# Patient Record
Sex: MALE | URBAN - METROPOLITAN AREA
[De-identification: names, ages, dates, MRNs, and addresses within clinical notes are randomized per-mention and may not be internally consistent; named-entity substitution may affect disease eponyms.]

---

## 2022-03-02 ENCOUNTER — APPOINTMENT (RX ONLY)
Dept: URBAN - METROPOLITAN AREA CLINIC 332 | Facility: CLINIC | Age: 77
Setting detail: DERMATOLOGY
End: 2022-03-02

## 2022-03-02 PROBLEM — C44.41 BASAL CELL CARCINOMA OF SKIN OF SCALP AND NECK: Status: ACTIVE | Noted: 2022-03-02

## 2022-03-02 PROBLEM — C44.722 SQUAMOUS CELL CARCINOMA OF SKIN OF RIGHT LOWER LIMB, INCLUDING HIP: Status: ACTIVE | Noted: 2022-03-02

## 2022-03-02 PROCEDURE — ? ADDITIONAL NOTES

## 2022-03-02 PROCEDURE — 99203 OFFICE O/P NEW LOW 30 MIN: CPT

## 2022-03-02 PROCEDURE — ? SURGICAL DECISION MAKING

## 2022-03-02 PROCEDURE — ? MOHS APPROPRIATE USE CRITERIA

## 2022-03-02 NOTE — PROCEDURE: MOHS APPROPRIATE USE CRITERIA
Initial Size Of Lesion In Cm (Y Axis): 1
Detail Level: Detailed
Initial Size Of Lesion In Cm (X Axis): 1.2
Initial Size Of Lesion In Cm (X Axis): 1.1
Initial Size Of Lesion In Cm (Y Axis): 0.8
Initial Size Of Lesion In Cm (Y Axis): 1.5
Initial Size Of Lesion In Cm (X Axis): 1.7

## 2022-03-02 NOTE — PROCEDURE: ADDITIONAL NOTES
Additional Notes: Patient consent was obtained to proceed with the visit and recommended plan of care after discussion of all risks and benefits, including the risks of COVID-19 exposure.
Render Risk Assessment In Note?: no
Detail Level: Simple
Additional Notes: Patient consent  was obtained to proceed with the visit and recommended plan of care after discussion of all risks and benefits, including the risks of COVID-19 exposure.

## 2022-03-02 NOTE — PROCEDURE: SURGICAL DECISION MAKING
Discussion: Risks may include and are not limited to blood loss, infection, reaction to anesthesia, numbness and/or lack of sensation, nerve damage, recurrence of the lesion or the formation of thick or otherwise objectionable scars or keloids, benefits to Mohs Surgery and alternatives to treatment to include (but not limited to) X-ray radiation treatment, ED&C (scrapte and burn procedure), cryosurgery, standard excision, interferon and laser treatments discussed at length with patient today.
Initial Decision For Surgery?: Yes
Complexity (Necessary For Coding; Major - 90 Day Global With Some Exceptions; Minor - 10 Day Global): minor
Risk Assessment Explanation (Does Not Render In The Note): Clinical determination of the probability and/or consequences of an event, such as surgery. Clinical assessment of the level of risk is affected by the nature of the event under consideration for the patient. Modifier 57 is used to indicate an Evaluation and Management (E/M) service resulted in the initial decision to perform surgery either the day before a major surgery (90 day global) or the day of a major surgery.
Initial Decision For Surgery?: No
Complexity (Necessary For Coding; Major - 90 Day Global With Some Exceptions; Minor - 10 Day Global): major

## 2022-06-17 NOTE — PERIOP NOTE
Tc from patient regarding preop instructions for scheduled surgery on 06/20/22-arrival info of 1045am given w/npo instructions, no valuables, baths x2 using medical soap, , and hospital address given-pt verb understandng of all info given/thogan,rn

## 2022-06-20 ENCOUNTER — HOSPITAL ENCOUNTER (OUTPATIENT)
Age: 77
Setting detail: OUTPATIENT SURGERY
Discharge: HOME | End: 2022-06-20
Attending: OTOLARYNGOLOGY | Admitting: OTOLARYNGOLOGY
Payer: MEDICARE

## 2022-06-20 ENCOUNTER — ANESTHESIA (OUTPATIENT)
Dept: SURGERY | Age: 77
End: 2022-06-20
Payer: MEDICARE

## 2022-06-20 ENCOUNTER — ANESTHESIA EVENT (OUTPATIENT)
Dept: SURGERY | Age: 77
End: 2022-06-20
Payer: MEDICARE

## 2022-06-20 VITALS
WEIGHT: 168.2 LBS | OXYGEN SATURATION: 98 % | SYSTOLIC BLOOD PRESSURE: 124 MMHG | HEART RATE: 76 BPM | RESPIRATION RATE: 20 BRPM | BODY MASS INDEX: 22.78 KG/M2 | DIASTOLIC BLOOD PRESSURE: 60 MMHG | HEIGHT: 72 IN | TEMPERATURE: 97.5 F

## 2022-06-20 LAB
HGB BLD-MCNC: 11 G/DL (ref 13.6–17.2)
POTASSIUM BLD-SCNC: 3.2 MMOL/L (ref 3.5–5.1)

## 2022-06-20 PROCEDURE — 3600000004 HC SURGERY LEVEL 4 BASE: Performed by: OTOLARYNGOLOGY

## 2022-06-20 PROCEDURE — 2709999900 HC NON-CHARGEABLE SUPPLY: Performed by: OTOLARYNGOLOGY

## 2022-06-20 PROCEDURE — 3700000000 HC ANESTHESIA ATTENDED CARE: Performed by: OTOLARYNGOLOGY

## 2022-06-20 PROCEDURE — 2580000003 HC RX 258: Performed by: NURSE ANESTHETIST, CERTIFIED REGISTERED

## 2022-06-20 PROCEDURE — 7100000001 HC PACU RECOVERY - ADDTL 15 MIN: Performed by: OTOLARYNGOLOGY

## 2022-06-20 PROCEDURE — 7100000000 HC PACU RECOVERY - FIRST 15 MIN: Performed by: OTOLARYNGOLOGY

## 2022-06-20 PROCEDURE — 2580000003 HC RX 258: Performed by: ANESTHESIOLOGY

## 2022-06-20 PROCEDURE — 7100000011 HC PHASE II RECOVERY - ADDTL 15 MIN: Performed by: OTOLARYNGOLOGY

## 2022-06-20 PROCEDURE — 6360000002 HC RX W HCPCS: Performed by: NURSE ANESTHETIST, CERTIFIED REGISTERED

## 2022-06-20 PROCEDURE — 2500000003 HC RX 250 WO HCPCS: Performed by: NURSE ANESTHETIST, CERTIFIED REGISTERED

## 2022-06-20 PROCEDURE — 3600000014 HC SURGERY LEVEL 4 ADDTL 15MIN: Performed by: OTOLARYNGOLOGY

## 2022-06-20 PROCEDURE — 84132 ASSAY OF SERUM POTASSIUM: CPT

## 2022-06-20 PROCEDURE — 7100000010 HC PHASE II RECOVERY - FIRST 15 MIN: Performed by: OTOLARYNGOLOGY

## 2022-06-20 PROCEDURE — 6360000002 HC RX W HCPCS: Performed by: ANESTHESIOLOGY

## 2022-06-20 PROCEDURE — 6370000000 HC RX 637 (ALT 250 FOR IP): Performed by: ANESTHESIOLOGY

## 2022-06-20 PROCEDURE — 3700000001 HC ADD 15 MINUTES (ANESTHESIA): Performed by: OTOLARYNGOLOGY

## 2022-06-20 PROCEDURE — 2500000003 HC RX 250 WO HCPCS: Performed by: OTOLARYNGOLOGY

## 2022-06-20 PROCEDURE — 85018 HEMOGLOBIN: CPT

## 2022-06-20 PROCEDURE — 6360000002 HC RX W HCPCS: Performed by: OTOLARYNGOLOGY

## 2022-06-20 PROCEDURE — 36415 COLL VENOUS BLD VENIPUNCTURE: CPT

## 2022-06-20 RX ORDER — FENTANYL CITRATE 50 UG/ML
100 INJECTION, SOLUTION INTRAMUSCULAR; INTRAVENOUS
Status: COMPLETED | OUTPATIENT
Start: 2022-06-20 | End: 2022-06-20

## 2022-06-20 RX ORDER — HALOPERIDOL 5 MG/ML
1 INJECTION INTRAMUSCULAR
Status: DISCONTINUED | OUTPATIENT
Start: 2022-06-20 | End: 2022-06-20 | Stop reason: HOSPADM

## 2022-06-20 RX ORDER — LIDOCAINE HYDROCHLORIDE AND EPINEPHRINE 10; 10 MG/ML; UG/ML
INJECTION, SOLUTION INFILTRATION; PERINEURAL PRN
Status: DISCONTINUED | OUTPATIENT
Start: 2022-06-20 | End: 2022-06-20 | Stop reason: ALTCHOICE

## 2022-06-20 RX ORDER — IPRATROPIUM BROMIDE AND ALBUTEROL SULFATE 2.5; .5 MG/3ML; MG/3ML
1 SOLUTION RESPIRATORY (INHALATION)
Status: DISCONTINUED | OUTPATIENT
Start: 2022-06-20 | End: 2022-06-20 | Stop reason: HOSPADM

## 2022-06-20 RX ORDER — AMOXICILLIN AND CLAVULANATE POTASSIUM 875; 125 MG/1; MG/1
1 TABLET, FILM COATED ORAL 2 TIMES DAILY
Qty: 14 TABLET | Refills: 0 | Status: SHIPPED | OUTPATIENT
Start: 2022-06-20 | End: 2022-06-27

## 2022-06-20 RX ORDER — HYDROMORPHONE HYDROCHLORIDE 2 MG/ML
0.5 INJECTION, SOLUTION INTRAMUSCULAR; INTRAVENOUS; SUBCUTANEOUS EVERY 5 MIN PRN
Status: DISCONTINUED | OUTPATIENT
Start: 2022-06-20 | End: 2022-06-20 | Stop reason: HOSPADM

## 2022-06-20 RX ORDER — ACETAMINOPHEN 500 MG
1000 TABLET ORAL ONCE
Status: COMPLETED | OUTPATIENT
Start: 2022-06-20 | End: 2022-06-20

## 2022-06-20 RX ORDER — PROCHLORPERAZINE EDISYLATE 5 MG/ML
5 INJECTION INTRAMUSCULAR; INTRAVENOUS
Status: DISCONTINUED | OUTPATIENT
Start: 2022-06-20 | End: 2022-06-20 | Stop reason: HOSPADM

## 2022-06-20 RX ORDER — OXYCODONE HYDROCHLORIDE 5 MG/1
5 TABLET ORAL
Status: DISCONTINUED | OUTPATIENT
Start: 2022-06-20 | End: 2022-06-20 | Stop reason: HOSPADM

## 2022-06-20 RX ORDER — ONDANSETRON 2 MG/ML
INJECTION INTRAMUSCULAR; INTRAVENOUS PRN
Status: DISCONTINUED | OUTPATIENT
Start: 2022-06-20 | End: 2022-06-20 | Stop reason: SDUPTHER

## 2022-06-20 RX ORDER — DEXAMETHASONE SODIUM PHOSPHATE 4 MG/ML
INJECTION, SOLUTION INTRA-ARTICULAR; INTRALESIONAL; INTRAMUSCULAR; INTRAVENOUS; SOFT TISSUE PRN
Status: DISCONTINUED | OUTPATIENT
Start: 2022-06-20 | End: 2022-06-20

## 2022-06-20 RX ORDER — MIDAZOLAM HYDROCHLORIDE 2 MG/2ML
2 INJECTION, SOLUTION INTRAMUSCULAR; INTRAVENOUS
Status: DISCONTINUED | OUTPATIENT
Start: 2022-06-20 | End: 2022-06-20 | Stop reason: HOSPADM

## 2022-06-20 RX ORDER — SODIUM CHLORIDE 0.9 % (FLUSH) 0.9 %
5-40 SYRINGE (ML) INJECTION EVERY 12 HOURS SCHEDULED
Status: DISCONTINUED | OUTPATIENT
Start: 2022-06-20 | End: 2022-06-20 | Stop reason: HOSPADM

## 2022-06-20 RX ORDER — SODIUM CHLORIDE 0.9 % (FLUSH) 0.9 %
5-40 SYRINGE (ML) INJECTION PRN
Status: DISCONTINUED | OUTPATIENT
Start: 2022-06-20 | End: 2022-06-20 | Stop reason: HOSPADM

## 2022-06-20 RX ORDER — LIDOCAINE HYDROCHLORIDE 20 MG/ML
INJECTION, SOLUTION EPIDURAL; INFILTRATION; INTRACAUDAL; PERINEURAL PRN
Status: DISCONTINUED | OUTPATIENT
Start: 2022-06-20 | End: 2022-06-20 | Stop reason: SDUPTHER

## 2022-06-20 RX ORDER — SCOLOPAMINE TRANSDERMAL SYSTEM 1 MG/1
1 PATCH, EXTENDED RELEASE TRANSDERMAL
Status: DISCONTINUED | OUTPATIENT
Start: 2022-06-20 | End: 2022-06-20 | Stop reason: HOSPADM

## 2022-06-20 RX ORDER — DEXAMETHASONE SODIUM PHOSPHATE 10 MG/ML
INJECTION INTRAMUSCULAR; INTRAVENOUS PRN
Status: DISCONTINUED | OUTPATIENT
Start: 2022-06-20 | End: 2022-06-20 | Stop reason: SDUPTHER

## 2022-06-20 RX ORDER — SODIUM CHLORIDE, SODIUM LACTATE, POTASSIUM CHLORIDE, CALCIUM CHLORIDE 600; 310; 30; 20 MG/100ML; MG/100ML; MG/100ML; MG/100ML
INJECTION, SOLUTION INTRAVENOUS CONTINUOUS
Status: DISCONTINUED | OUTPATIENT
Start: 2022-06-20 | End: 2022-06-20 | Stop reason: HOSPADM

## 2022-06-20 RX ORDER — SODIUM CHLORIDE 9 MG/ML
INJECTION, SOLUTION INTRAVENOUS PRN
Status: DISCONTINUED | OUTPATIENT
Start: 2022-06-20 | End: 2022-06-20 | Stop reason: HOSPADM

## 2022-06-20 RX ORDER — PROPOFOL 10 MG/ML
INJECTION, EMULSION INTRAVENOUS PRN
Status: DISCONTINUED | OUTPATIENT
Start: 2022-06-20 | End: 2022-06-20 | Stop reason: SDUPTHER

## 2022-06-20 RX ADMIN — PHENYLEPHRINE HYDROCHLORIDE 1 ML: 10 INJECTION INTRAVENOUS at 13:43

## 2022-06-20 RX ADMIN — ONDANSETRON 4 MG: 2 INJECTION INTRAMUSCULAR; INTRAVENOUS at 14:10

## 2022-06-20 RX ADMIN — PROPOFOL 50 MG: 10 INJECTION, EMULSION INTRAVENOUS at 13:32

## 2022-06-20 RX ADMIN — LIDOCAINE HYDROCHLORIDE 40 MG: 20 INJECTION, SOLUTION EPIDURAL; INFILTRATION; INTRACAUDAL; PERINEURAL at 13:30

## 2022-06-20 RX ADMIN — PROPOFOL 130 MG: 10 INJECTION, EMULSION INTRAVENOUS at 13:30

## 2022-06-20 RX ADMIN — SODIUM CHLORIDE, POTASSIUM CHLORIDE, SODIUM LACTATE AND CALCIUM CHLORIDE: 600; 310; 30; 20 INJECTION, SOLUTION INTRAVENOUS at 11:47

## 2022-06-20 RX ADMIN — PHENYLEPHRINE HYDROCHLORIDE 1 ML: 10 INJECTION INTRAVENOUS at 13:48

## 2022-06-20 RX ADMIN — ACETAMINOPHEN 1000 MG: 500 TABLET, FILM COATED ORAL at 11:44

## 2022-06-20 RX ADMIN — Medication 2 G: at 13:26

## 2022-06-20 RX ADMIN — FENTANYL CITRATE 50 MCG: 50 INJECTION, SOLUTION INTRAMUSCULAR; INTRAVENOUS at 13:30

## 2022-06-20 RX ADMIN — PHENYLEPHRINE HYDROCHLORIDE 1 ML: 10 INJECTION INTRAVENOUS at 13:45

## 2022-06-20 RX ADMIN — DEXAMETHASONE SODIUM PHOSPHATE 10 MG: 10 INJECTION INTRAMUSCULAR; INTRAVENOUS at 13:50

## 2022-06-20 RX ADMIN — PHENYLEPHRINE HYDROCHLORIDE 2 ML: 10 INJECTION INTRAVENOUS at 13:56

## 2022-06-20 ASSESSMENT — PAIN DESCRIPTION - LOCATION: LOCATION: HIP

## 2022-06-20 ASSESSMENT — PAIN DESCRIPTION - ORIENTATION: ORIENTATION: LEFT

## 2022-06-20 ASSESSMENT — PAIN DESCRIPTION - DESCRIPTORS
DESCRIPTORS: ACHING
DESCRIPTORS: ACHING

## 2022-06-20 NOTE — ANESTHESIA POSTPROCEDURE EVALUATION
Department of Anesthesiology  Postprocedure Note    Patient: Amy Tran  MRN: [id number]  YOB: 1945  Date of evaluation: 6/20/2022      Procedure Summary     Date: 06/20/22 Room / Location: [facility] MAIN OR  / [facility] MAIN OR    Anesthesia Start: 1316 Anesthesia Stop: 8921    Procedure: SPLIT THICKNESS GRAFT FROM RIGHT THIGH TO SCALP (Left Head) Diagnosis:       Acquired deformity of head      Basal cell carcinoma of scalp or skin of neck      Open wound of scalp, unspecified open wound type, initial encounter      (Acquired deformity of head [M95.2])      (Basal cell carcinoma of scalp or skin of neck [C44.41])      (Open wound of scalp, unspecified open wound type, initial encounter [S01.00XA])    Providers: Eduardo Link MD Responsible Provider: Orlene Hatchet, MD    Anesthesia Type: general ASA Status: 3          Anesthesia Type: No value filed. Stuart Phase I: Stuart Score: 9    Stuart Phase II: Stuart Score: 10    Last vitals:   Vitals Value Taken Time   /60 06/20/22 1500   Temp 97.5 °F (36.4 °C) 06/20/22 1455   Pulse 83 06/20/22 1501   Resp 20 06/20/22 1500   SpO2 96 % 06/20/22 1501   Vitals shown include unvalidated device data.       Anesthesia Post Evaluation    Patient location during evaluation: PACU  Patient participation: complete - patient participated  Level of consciousness: awake and awake and alert  Airway patency: patent  Nausea & Vomiting: no nausea  Complications: no  Cardiovascular status: hemodynamically stable  Hydration status: euvolemic

## 2022-06-20 NOTE — PERIOP NOTE
Discharge instructions reviewed with pt and family member Salazar Sanchez -wife) who verbalize understanding of follow up care.

## 2022-06-20 NOTE — ANESTHESIA PROCEDURE NOTES
Airway  Date/Time: 6/20/2022 1:33 PM  Urgency: elective    Airway not difficult    General Information and Staff    Patient location during procedure: OR  Resident/CRNA: DAVE Gilmore - CRNA    Indications and Patient Condition  Indications for airway management: anesthesia  Spontaneous ventilation: present  Sedation level: deep  Preoxygenated: yes  Patient position: sniffing  MILS not maintained throughout  Mask difficulty assessment: vent by bag mask    Final Airway Details  Final airway type: supraglottic airway      Successful airway: oropharyngeal  Size 5    Number of attempts at approach: 3 or more  Ventilation between attempts: bag mask  Number of other approaches attempted: 1    Other Attempts  Unsuccessful attempted airways: oropharyngeal      Additional Comments  Changed #4lma to #5

## 2022-06-20 NOTE — H&P
History and Physical [facility]      SUBJECTIVE:   Robbin Baca is a 64 y.o. male who presents for interval split thickness skin grafting after scalp resection of a large basal cell carcinoma. He has no infection or purulence of his scalp and has had Integra for the prior 4 weeks without difficulty. OBJECTIVE:  Vital signs as noted above. Patient appears to be in no pain. Physical Exam  Constitutional:       Appearance: He is normal weight. HENT:      Head: Normocephalic. Comments: 4cm x 8cm scalp defect with Integra - no bleeding or purulence. Some residual staples and sutures     Mouth/Throat:      Mouth: Mucous membranes are moist.      Pharynx: Oropharynx is clear. Cardiovascular:      Rate and Rhythm: Normal rate and regular rhythm. Pulses: Normal pulses. Heart sounds: Normal heart sounds. Pulmonary:      Effort: Pulmonary effort is normal. No respiratory distress. Breath sounds: Normal breath sounds. No stridor. Musculoskeletal:         General: No swelling, tenderness, deformity or signs of injury. Skin:     General: Skin is warm and dry. Comments: Right thigh appropriate for skin grafting   Neurological:      General: No focal deficit present. Mental Status: He is alert. ASSESSMENT:   64 yr old male with a large predominantly right sided scalp defect who presents for definitive skin grafting and bolster placement. Donor site is right thigh. PLAN:  OR under Anesthesia choice for STSG from right thigh to scalp  Bolster placement  Patient to come to clinic at [facility] on Thursday 2/23 for bolster take down. Consent reviewed and obtained.

## 2022-06-20 NOTE — ANESTHESIA PRE PROCEDURE
Department of Anesthesiology  Preprocedure Note       Name:  Kelby Pritchard   Age:  64 y.o.  :  1945                                          MRN:  [id number]         Date:  2022      Surgeon: Nury Alegria): Frandy Cullen MD    Procedure: Procedure(s):  SPLIT THICKNESS GRAFT FROM LEFT THIGH TO SCALP    Medications prior to admission:   Prior to Admission medications    Medication Sig Start Date End Date Taking?  Authorizing Provider   metoprolol succinate (TOPROL XL) 50 MG extended release tablet Take 50 mg by mouth daily    Historical Provider, MD   furosemide (LASIX) 40 MG tablet Take 40 mg by mouth daily 22  Historical Provider, MD   atorvastatin (LIPITOR) 40 MG tablet Take 40 mg by mouth daily 2/15/22 2/15/23  Historical Provider, MD   tamsulosin (FLOMAX) 0.4 mg capsule Take 0.4 mg by mouth daily    Historical Provider, MD   docusate sodium (COLACE) 100 mg capsule Take 100 mg by mouth 2 times daily    Historical Provider, MD       Current medications:    Current Facility-Administered Medications   Medication Dose Route Frequency Provider Last Rate Last Admin    lidocaine 1 % injection 1 mL  1 mL IntraDERmal Once PRN Stephanie Armenta MD        acetaminophen (TYLENOL) tablet 1,000 mg  1,000 mg Oral Once Stephanie Armenta MD        fentaNYL (SUBLIMAZE) injection 100 mcg  100 mcg IntraVENous Once PRN Stephanie Armenta MD        lactated ringers infusion   IntraVENous Continuous Stephanie Armenta MD        sodium chloride flush 0.9 % injection 5-40 mL  5-40 mL IntraVENous 2 times per day Stephanie Armenta MD        sodium chloride flush 0.9 % injection 5-40 mL  5-40 mL IntraVENous PRN Stephanie Armenta MD        0.9 % sodium chloride infusion   IntraVENous PRN Stephanie Armenta MD        midazolam PF (VERSED) injection 2 mg  2 mg IntraVENous Once PRN Stephanie Armenta MD        scopolamine (TRANSDERM-SCOP) transdermal patch 1 patch  1 patch TransDERmal NOW Fabiana Boggs MD           Allergies:  No Known Allergies    Problem List:  There is no problem list on file for this patient. Past Medical History:        Diagnosis Date    Arthritis     Basal cell carcinoma     CAD (coronary artery disease)     Colon cancer ([facility] [place] [address]     Hyperlipidemia     Hypertension     Nonfunctioning kidney     Left    Renal hemorrhage, left 04/2022       Past Surgical History:        Procedure Laterality Date    CATARACT REMOVAL WITH IMPLANT      COLON SURGERY      resection    CORONARY ARTERY BYPASS GRAFT      MOHS SURGERY      RENAL ARTERY EMBOLIZATION  04/2022    SCALP LESION REMOVAL W/ FLAP AND SKIN GRAFT  05/20/2022    SKIN CANCER EXCISION         Social History:    Social History     Tobacco Use    Smoking status: Current Some Day Smoker     Types: Cigars    Smokeless tobacco: Never Used   Substance Use Topics    Alcohol use: Not Currently                                Ready to quit: Not Answered  Counseling given: Not Answered      Vital Signs (Current): There were no vitals filed for this visit. BP Readings from Last 3 Encounters:   No data found for BP       NPO Status:                                                                                 BMI:   Wt Readings from Last 3 Encounters:   06/13/22 170 lb (77.1 kg)     There is no height or weight on file to calculate BMI.    CBC: No results found for: WBC, RBC, HGB, HCT, MCV, RDW, PLT    CMP: No results found for: NA, K, CL, CO2, BUN, CREATININE, GFRAA, AGRATIO, LABGLOM, GLUCOSE, GLU, PROT, CALCIUM, BILITOT, ALKPHOS, AST, ALT    POC Tests:   Recent Labs     06/20/22  1123   POCK 3.2*       Coags: No results found for: PROTIME, INR, APTT    HCG (If Applicable): No results found for: PREGTESTUR, PREGSERUM, HCG, HCGQUANT     ABGs: No results found for: PHART, PO2ART, ISF4YEF, CZR3KVJ, BEART, V4EUNLDW     Type & Screen (If Applicable):  No results found for: LABABO, LABRH    Drug/Infectious Status (If Applicable):   No results found for: HIV, HEPCAB    COVID-19 Screening (If Applicable): No results found for: COVID19        Anesthesia Evaluation  Patient summary reviewed and Nursing notes reviewed  Airway: Mallampati: II  TM distance: >3 FB   Neck ROM: full     Dental:          Pulmonary:Negative Pulmonary ROS and normal exam                               Cardiovascular:  Exercise tolerance: good (>4 METS),   (+) hypertension:, CAD: no interval change, CABG/stent: no interval change,         Rhythm: regular  Rate: normal                    Neuro/Psych:   Negative Neuro/Psych ROS              GI/Hepatic/Renal: Neg GI/Hepatic/Renal ROS            Endo/Other: Negative Endo/Other ROS             Pt had no PAT visit       Abdominal:             Vascular: negative vascular ROS. Other Findings:           Anesthesia Plan      general     ASA 3       Induction: intravenous. Anesthetic plan and risks discussed with patient.       Plan discussed with surgical team.                    Angeli Abdul MD   6/20/2022

## 2022-06-20 NOTE — OP NOTE
Operative Note      Patient: Sammy Sanchez  YOB: 1945  MRN: [id number]    Date of Procedure: 6/20/2022    Pre-Op Diagnosis: Acquired deformity of head [M95.2]  Basal cell carcinoma of scalp or skin of neck [C44.41]  Open wound of scalp, unspecified open wound type, initial encounter [S01.00XA]    Post-Op Diagnosis: Same as above       Procedure(s):  SPLIT THICKNESS GRAFT FROM RIGHT THIGH TO SCALP 32743 cpt    Surgeon(s): Sunny Mcpherson MD    Assistant:   * No surgical staff found *    Anesthesia: General    Estimated Blood Loss (mL): 5 cc    Complications: none    Specimens:   * No specimens in log *    Implants:  * No implants in log *      Drains: * No LDAs found *    Findings: 8 cm x 4 cm scalp defect with Integra - excellent granulation base    8 cm x 4 cm x 0.018 inch STSG from right thigh to scalp with bolster    Detailed Description of Procedure:   Mr. Abner Hardin was met in the pre-operative care area and the procedure was reviewed, consent discussed and obtained. The patient was taken to the OR and in the supine position, general anesthesia with an LMA was induced. A surgical timeout was performed. The procedure began with removal of residual stapes and suture from Integra placement and prior advancement flaps. The Integra plastic covering was removed and an excellent 100% coverage of granulation tissue was noted. Next an 8cm x 4cm split thickness skin graft was harvested with the Ev dermatome, 0.018 inches thick, from the prepared right thigh. The split thickness skin graft was circumferentially sutured into position with 5-0 fast gut suture. 2-0 Silk sutures were used for placement and securing a Xeroform Bolster. The thigh donor site was dressed in the usual fashion. The scalp wound was then dressed. The patient was returned to anesthesia's care, extubated and taken to the PACU.      Electronically signed by Sunny Mcpherson MD on 6/20/2022 at 2:20 PM

## 2023-10-04 NOTE — HPI: MOHS SURGERY CONSULTATION
Has The Cancer Been Biopsied Before?: has been previously biopsied
When Was Your Biopsy?: 2/9/2022
Accession (Optional): [id number]
Who Is Your Referring Provider?: Ramos Agosto
Strong peripheral pulses

## (undated) DEVICE — BANDAGE,GAUZE,BULKEE II,4.5"X4.1YD,STRL: Brand: MEDLINE

## (undated) DEVICE — SOLUTION IRRIG 1000ML 09% SOD CHL USP PIC PLAS CONTAINER

## (undated) DEVICE — MINOR SPLIT GENERAL: Brand: MEDLINE INDUSTRIES, INC.

## (undated) DEVICE — DRESSING FOAM W6XL8IN THN CNFRM ATRAUM W/ SAFETAC

## (undated) DEVICE — T-DRAPE,EXTREMITY,STERILE: Brand: MEDLINE